# Patient Record
Sex: MALE
[De-identification: names, ages, dates, MRNs, and addresses within clinical notes are randomized per-mention and may not be internally consistent; named-entity substitution may affect disease eponyms.]

---

## 2023-05-30 PROBLEM — Z00.00 ENCOUNTER FOR PREVENTIVE HEALTH EXAMINATION: Status: ACTIVE | Noted: 2023-05-30

## 2023-06-20 ENCOUNTER — APPOINTMENT (OUTPATIENT)
Dept: ORTHOPEDIC SURGERY | Facility: CLINIC | Age: 33
End: 2023-06-20
Payer: COMMERCIAL

## 2023-06-20 DIAGNOSIS — S46.312A STRAIN OF MUSCLE, FASCIA AND TENDON OF TRICEPS, LEFT ARM, INITIAL ENCOUNTER: ICD-10-CM

## 2023-06-20 DIAGNOSIS — M77.12 LATERAL EPICONDYLITIS, LEFT ELBOW: ICD-10-CM

## 2023-06-20 PROCEDURE — 99204 OFFICE O/P NEW MOD 45 MIN: CPT

## 2023-06-20 NOTE — ASSESSMENT
[FreeTextEntry1] : The patient comes in with pain in his left elbow. He does not recall any trauma or injury. He does hit his elbow a lot. This has been going on for 2-3 months. He has pain when he does triceps exercises. \par \par \par full active range of motion of the left shoulder, wrist and fingers\par full active range of motion of the left elbow\par Tenderness to palpation of the lateral epicondyle and proximal extensor supinator mass, distal triceps on the lateral aspect\par pain with resisted wrist extension and forearm supination\par 4/5 strength in supination and wrist extension, otherwise 5/5 strength, pain with resisted extension of triceps\par median/ulnar/radial sensation intact to light touch\par ain/pin/ulnar motor intact\par palpable pulses CR<2s\par \par The patient was advised of the diagnosis. The natural history of the pathology was explained in full to the patient in layman's terms. All questions were answered. The risks and benefits of surgical and non-surgical treatment alternatives were explained in full to the patient.We discussed treatment options including nsaids, topical gels, tennis elbow strap, PT, activity modification, cortisone inj, sx .pt is aware that symptoms usually resolve on their own in 95-99% of people, but the timeframe is unknown.Home exercises/stretches were demonstrated and the patient practiced them as well- They are to do the exercises hourly and hold the stretch for 30 seconds. \par Avoid repetitive wrist flexion time was spent instructing the patient on appropriate placement of the elbow strap as well.  Patient also seems to have triceps tendinitis.  He is going to add triceps exercises to this as well.\par The patient was shown different stretches. He was also given a handout. He will purchase a tennis elbow brace when he is active. I explained to the patient this type of injury takes a while to heal. If he feels he is not improving, he will follow up.\par